# Patient Record
Sex: FEMALE | Race: WHITE | Employment: OTHER | URBAN - METROPOLITAN AREA
[De-identification: names, ages, dates, MRNs, and addresses within clinical notes are randomized per-mention and may not be internally consistent; named-entity substitution may affect disease eponyms.]

---

## 2017-01-02 ENCOUNTER — TELEPHONE (OUTPATIENT)
Dept: PEDIATRICS | Facility: CLINIC | Age: 57
End: 2017-01-02

## 2017-01-02 NOTE — Clinical Note
[facility]  [address]  [place] [address]  [phone]      January 13, 2017    Shae Chavarria                                                                                                                                                       [address] [address]              Dear Shae,    This letter is to remind you that you are due for your pap smear, mammogram, FIT test,  cholesterol check and annual exam.    Please call the [facility] for an appointment at [phone].    If you have already had these services with another provider please call our office or send us a [employer] message with this information so we can update our records.    If you have transferred your care elsewhere, please contact our office and we would be happy to forward your records. If you have any financial concerns regarding this appointment, please call so that we can discuss this further.      Sincerely,    Frieda Montanez CMA

## 2017-01-02 NOTE — TELEPHONE ENCOUNTER
Panel Management Review      Patient has the following on her problem list: None      Composite cancer screening  Chart review shows that this patient is due/due soon for the following Pap Smear, Mammogram and Fecal Colorectal (FIT)  Summary:    Patient is due/failing the following:   FIT, LDL, MAMMOGRAM, PAP and PHYSICAL    Action needed:   Patient needs office visit for pap, mammo, FIT, cholesterol check, physical.    Type of outreach:    Phone, left message for patient to call back.     Questions for provider review:    None                                                                                                                                    Frieda Montanez CMA    Chart routed to Care Team .

## 2017-01-09 NOTE — TELEPHONE ENCOUNTER
Called and left VM for patient to contact clinic.  Patient needs mammo, FIT, cholesterol check, pap & physical.  Frieda Montanez, CMA

## 2017-03-20 ENCOUNTER — HOSPITAL ENCOUNTER (EMERGENCY)
Facility: CLINIC | Age: 57
Discharge: HOME | End: 2017-03-20
Attending: PSYCHIATRY & NEUROLOGY | Admitting: PSYCHIATRY & NEUROLOGY
Payer: COMMERCIAL

## 2017-03-20 VITALS
WEIGHT: 152.3 LBS | DIASTOLIC BLOOD PRESSURE: 76 MMHG | BODY MASS INDEX: 26.56 KG/M2 | RESPIRATION RATE: 16 BRPM | SYSTOLIC BLOOD PRESSURE: 146 MMHG | OXYGEN SATURATION: 94 % | TEMPERATURE: 95.6 F | HEART RATE: 63 BPM

## 2017-03-20 DIAGNOSIS — F43.22 ADJUSTMENT DISORDER WITH ANXIOUS MOOD: ICD-10-CM

## 2017-03-20 LAB
AMPHETAMINES UR QL SCN: ABNORMAL
BARBITURATES UR QL: ABNORMAL
BENZODIAZ UR QL: ABNORMAL
CANNABINOIDS UR QL SCN: ABNORMAL
COCAINE UR QL: ABNORMAL
ETHANOL UR QL SCN: ABNORMAL
OPIATES UR QL SCN: ABNORMAL

## 2017-03-20 PROCEDURE — 90791 PSYCH DIAGNOSTIC EVALUATION: CPT

## 2017-03-20 PROCEDURE — 80320 DRUG SCREEN QUANTALCOHOLS: CPT | Performed by: EMERGENCY MEDICINE

## 2017-03-20 PROCEDURE — 80307 DRUG TEST PRSMV CHEM ANLYZR: CPT | Performed by: EMERGENCY MEDICINE

## 2017-03-20 PROCEDURE — 99284 EMERGENCY DEPT VISIT MOD MDM: CPT | Mod: Z6 | Performed by: PSYCHIATRY & NEUROLOGY

## 2017-03-20 PROCEDURE — 99285 EMERGENCY DEPT VISIT HI MDM: CPT | Mod: 25

## 2017-03-20 RX ORDER — ESCITALOPRAM OXALATE 10 MG/1
10 TABLET ORAL DAILY
Qty: 30 TABLET | Refills: 0 | Status: SHIPPED | OUTPATIENT
Start: 2017-03-20

## 2017-03-20 ASSESSMENT — ENCOUNTER SYMPTOMS
RESPIRATORY NEGATIVE: 1
DYSPHORIC MOOD: 1
CARDIOVASCULAR NEGATIVE: 1
NEUROLOGICAL NEGATIVE: 1
ENDOCRINE NEGATIVE: 1
EYES NEGATIVE: 1
SLEEP DISTURBANCE: 1
HYPERACTIVE: 0
NERVOUS/ANXIOUS: 1
GASTROINTESTINAL NEGATIVE: 1
CONSTITUTIONAL NEGATIVE: 1
MUSCULOSKELETAL NEGATIVE: 1
DECREASED CONCENTRATION: 1
HALLUCINATIONS: 0
HEMATOLOGIC/LYMPHATIC NEGATIVE: 1

## 2017-03-20 NOTE — ED PROVIDER NOTES
History     Chief Complaint   Patient presents with     Mental Health Problem     Suicidal     The history is provided by the patient and medical records.     Shae Chavarria is a 56 year old female who is here brought by daughter as patient has been feeling highly anxious and tearful. She had no prior history of mental illness nor chemical dependency. She reports her work got stressful a few months ago when she was diagnosed with cataracts and glaucoma. She works as a para for 16 years and she had been making some mistakes that was concerning to other staff. The principal has been reviewing her progress weekly and it has been upsetting. She was disclosing passive SI, prompting her daughter to bring her in. Patient does not feel she needs admission. She is open to seeing a therapist and trying a psychotropic. Patient denies drug use. Her drug screen is positive for opiates and she admitted to taking (only one time last night) tylenol with codeine to get some sleep. She saved an old prescription for when she had a D&C. There is no thought disorder.    PERSONAL MEDICAL HISTORY  History reviewed. No pertinent past medical history.  PAST SURGICAL HISTORY  Past Surgical History   Procedure Laterality Date     Gyn surgery            Eye surgery       bilateral cataract surgery     FAMILY HISTORY  Family History   Problem Relation Age of Onset     Hypertension Mother      Hyperlipidemia Father      SOCIAL HISTORY  Social History   Substance Use Topics     Smoking status: Former Smoker     Smokeless tobacco: Never Used     Alcohol use Yes      Comment: rarely     MEDICATIONS  No current facility-administered medications for this encounter.      Current Outpatient Prescriptions   Medication     escitalopram (LEXAPRO) 10 MG tablet     Multiple Vitamins-Minerals (MULTIVITAMIN PO)     ciclopirox 8 % SOLN     loratadine (CLARITIN) 10 MG tablet     hydrocortisone (ANUSOL-HC) 2.5 % rectal cream     IBUPROFEN PO      cetirizine (ZYRTEC) 10 MG tablet     mometasone (NASONEX) 50 MCG/ACT nasal spray     ALLERGIES  Allergies   Allergen Reactions     Amoxicillin      Keflex [Cephalexin Monohydrate]      I have reviewed the Medications, Allergies, Past Medical and Surgical History, and Social History in the Epic system.    Review of Systems   Constitutional: Negative.    HENT: Negative.    Eyes: Negative.    Respiratory: Negative.    Cardiovascular: Negative.    Gastrointestinal: Negative.    Endocrine: Negative.    Genitourinary: Negative.    Musculoskeletal: Negative.    Skin: Negative.    Neurological: Negative.    Hematological: Negative.    Psychiatric/Behavioral: Positive for decreased concentration, dysphoric mood and sleep disturbance. Negative for hallucinations and suicidal ideas. The patient is nervous/anxious. The patient is not hyperactive.    All other systems reviewed and are negative.      Physical Exam   BP: (!) 169/96  Heart Rate: 80  Temp: 96.3  F (35.7  C)  Resp: 16  Weight: 69.1 kg (152 lb 4.8 oz)  SpO2: 99 %  Physical Exam   Constitutional: She appears well-developed.   HENT:   Head: Normocephalic.   Eyes: Pupils are equal, round, and reactive to light.   Neck: Normal range of motion.   Cardiovascular: Normal rate.    Pulmonary/Chest: Effort normal.   Abdominal: Soft.   Musculoskeletal: Normal range of motion.   Neurological: She is alert.   Skin: Skin is warm.   Psychiatric: Her speech is normal and behavior is normal. Judgment and thought content normal. Her mood appears anxious. She is not agitated, not aggressive, not hyperactive, not actively hallucinating and not combative. Thought content is not paranoid and not delusional. Cognition and memory are normal. She expresses no homicidal and no suicidal ideation.   Nursing note and vitals reviewed.      ED Course     ED Course     Procedures      Labs Ordered and Resulted from Time of ED Arrival Up to the Time of Departure from the ED   DRUG ABUSE SCREEN 6 CHEM  DEP URINE ([facility]) - Abnormal; Notable for the following:        Result Value    Opiates Qualitative Urine   (*)     Value: Positive   Cutoff for a positive opiate is greater than 300 ng/mL. This is an unconfirmed   screening result to be used for medical purposes only.      All other components within normal limits       Assessments & Plan (with Medical Decision Making)   Patient with an adjustment disorder with anxious mood. She can be discharged. She was prescribed Lexapro. This should not interfere with her open angle glaucoma. She was also referred for therapy, set up by [facility]. Patient is to follow-up established care and services.    I have reviewed the nursing notes.    I have reviewed the findings, diagnosis, plan and need for follow up with the patient.    Discharge Medication List as of 3/20/2017  7:03 PM      START taking these medications    Details   escitalopram (LEXAPRO) 10 MG tablet Take 1 tablet (10 mg) by mouth daily, Disp-30 tablet, R-0, Local Print             Final diagnoses:   Adjustment disorder with anxious mood       3/20/2017   [facility], [place], EMERGENCY DEPARTMENT     Ramon Bermudez MD  03/20/17 2017

## 2017-03-20 NOTE — ED AVS SNAPSHOT
[facility], [place], Emergency Department    [address]    [facility] [place] [place] [address]    Phone:  [phone]    Fax:  [phone]                                       Shae Chavarria   MRN: [id number]    Department:  [facility], Emergency Department   Date of Visit:  3/20/2017           After Visit Summary Signature Page     I have received my discharge instructions, and my questions have been answered. I have discussed any challenges I see with this plan with the nurse or doctor.    ..........................................................................................................................................  Patient/Patient Representative Signature      ..........................................................................................................................................  Patient Representative Print Name and Relationship to Patient    ..................................................               ................................................  Date                                            Time    ..........................................................................................................................................  Reviewed by Signature/Title    ...................................................              ..............................................  Date                                                            Time

## 2017-03-20 NOTE — DISCHARGE INSTRUCTIONS
Take Lexapro to manage mood (anxiety). Follow-up with your established care provider for further management and refills until you can see the [facility]-referred provider  Follow-up [facility]-referred therapy for support and coping skills

## 2017-03-20 NOTE — ED AVS SNAPSHOT
[facility], Emergency Department    [address]    [place] [address]    Phone:  [phone]    Fax:  [phone]                                       Shae Chavarria   MRN: [id number]    Department:  [facility], Emergency Department   Date of Visit:  3/20/2017           Patient Information     Date Of Birth          1960        Your diagnoses for this visit were:     Adjustment disorder with anxious mood        You were seen by Ramon Bermudez MD.      Follow-up Information     Follow up with No Ref-Primary, Physician.        Discharge Instructions       Take Lexapro to manage mood (anxiety). Follow-up with your established care provider for further management and refills until you can see the [facility]-referred provider  Follow-up [facility]-referred therapy for support and coping skills    24 Hour Appointment Hotline       To make an appointment at any [facility] clinic, call [phone] ([phone]). If you don't have a family doctor or clinic, we will help you find one. [facility] clinics are conveniently located to serve the needs of you and your family.             Review of your medicines      START taking        Dose / Directions Last dose taken    escitalopram 10 MG tablet   Commonly known as:  LEXAPRO   Dose:  10 mg   Quantity:  30 tablet        Take 1 tablet (10 mg) by mouth daily   Refills:  0          Our records show that you are taking the medicines listed below. If these are incorrect, please call your family doctor or clinic.        Dose / Directions Last dose taken    cetirizine 10 MG tablet   Commonly known as:  zyrTEC   Dose:  10 mg   Quantity:  30 tablet        Take 1 tablet (10 mg) by mouth every evening   Refills:  1        ciclopirox 8 % Soln   Quantity:  3 Bottle        Apply every day for up to 12 months.  First day of each week, remove old medication and roughen surface of nail.   Refills:  2        hydrocortisone 2.5 % cream   Commonly known as:  ANUSOL-HC   Quantity:   "28.35 g        Place rectally 2 times daily   Refills:  3        IBUPROFEN PO        Refills:  0        loratadine 10 MG tablet   Commonly known as:  CLARITIN   Dose:  10 mg        Take 10 mg by mouth daily   Refills:  0        MULTIVITAMIN PO        Take by mouth daily   Refills:  0        NASONEX 50 MCG/ACT spray   Dose:  2 spray   Generic drug:  mometasone        2 sprays by Both Nostrils route daily.   Refills:  0                Prescriptions were sent or printed at these locations (1 Prescription)                   Other Prescriptions                Printed at Department/Unit printer (1 of 1)         escitalopram (LEXAPRO) 10 MG tablet                Procedures and tests performed during your visit     Drug abuse screen 6 urine (tox)      Orders Needing Specimen Collection     None      Pending Results     No orders found from 3/18/2017 to 3/21/2017.            Pending Culture Results     No orders found from 3/18/2017 to 3/21/2017.            Thank you for choosing [facility]       Thank you for choosing [facility] for your care. Our goal is always to provide you with excellent care. Hearing back from our patients is one way we can continue to improve our services. Please take a few minutes to complete the written survey that you may receive in the mail after you visit with us. Thank you!        [employer] Information     [employer] lets you send messages to your doctor, view your test results, renew your prescriptions, schedule appointments and more. To sign up, go to [email or web address] . Click on \"Log in\" on the left side of the screen, which will take you to the Welcome page. Then click on \"Sign up Now\" on the right side of the page.     You will be asked to enter the access code listed below, as well as some personal information. Please follow the directions to create your username and password.     Your access code is: [email or web address]  Expires: 2017  5:21 PM     Your access code will  in 90 days. If " you need help or a new code, please call your [facility] clinic or [phone].        Care EveryWhere ID     This is your Care EveryWhere ID. This could be used by other organizations to access your [facility] medical records  [id number]        After Visit Summary       This is your record. Keep this with you and show to your community pharmacist(s) and doctor(s) at your next visit.

## 2018-07-07 ENCOUNTER — TRANSFERRED RECORDS (OUTPATIENT)
Dept: HEALTH INFORMATION MANAGEMENT | Facility: CLINIC | Age: 58
End: 2018-07-07

## 2019-11-08 ENCOUNTER — VIRTUAL VISIT (OUTPATIENT)
Dept: FAMILY MEDICINE | Facility: OTHER | Age: 59
End: 2019-11-08

## 2019-11-09 NOTE — PROGRESS NOTES
"Date: 2019 21:55:34  Clinician: Yessica Harden  Clinician NPI: [id number]  Patient: Shae Chavarria  Patient : 1960  Patient Address: [address] [place] [address]  Patient Phone: [phone]  Visit Protocol: Tinea  Patient Summary:  Shae is a 59 year old ( : 1960 ) female who initiated a Visit for evaluation of Athlete's foot. When asked the question \"Please sign me up to receive news, health information and promotions. \", Shae responded \"Yes\".     Her symptoms started more than a month ago. The rash is red in color.   The affected area has dry, flaky skin. It feels burning, painful, itchy, and tender to touch.   Symptom details     Itching: Shae has moderate itching.     Pain: The pain is moderate (4-6 on a 10 point pain scale).      Denied symptoms include crusts, pimples, and blisters. Shae also denied raised, scaly patches on elbows and the front or back of the knees. Shae does not feel feverish. She does not have a rash in the shape of a bull's-eye. There are no red streaks extending from the rash.    Precipitating events  Just before the symptoms started, Shae did not come in contact with plants such as poison ivy or poison oak. She also did not come in contact with new soaps, lotions, or detergents.   Pertinent medical history  Treatments or home remedies used to relieve the symptoms as reported by the patient (free text): Urea 40 gel. I know what i have. It is moccasin tinea pedis. I stepped on the outside of a sunflower seed a couple years ago and i keep getting this every year but I cqn't just order the Ketoconazole.   Weight: 151 lbs    Shae does not smoke or use smokeless tobacco.          mometasone nasal    Claritin-D 24 Hour oral    montelukast oral    clonidine HCl oral       MEDICATIONS: mometasone nasal, Claritin-D 24 Hour oral, montelukast oral, clonidine HCl oral, ALLERGIES: amoxicillin, Keflex  Clinician Response:  Dear Shae,   Based on the information provided, you " have a fungal infection of your feet called tinea pedis (athlete's foot). Athlete's foot causes a red, itchy rash with cracked, peeling, or blistered skin on the feet, usually located between the toes.   This type of infection is caused by an overgrowth of the fungal germs normally on the body. Fungus grows best in warm moist areas.   Medication information  I am prescribing:     Terbinafine HCl 250 mg oral tablet. Take 1 tablet by mouth 1 time per day for 14 days. There are no refills with this prescription.   Self care  Touching the rash or an object that was in contact with the rash can spread the infection to other parts of your body and to other people. Wash your hands after touching the rash, and wash clothing, towels, and bedsheets often.  Do not go barefoot and consider wearing sandals in warm wet areas like a shower or tub, pool areas, or locker rooms to prevent spreading the infection to others. This will also lessen your chance of having athlete's foot again.  Steps you can take to be as comfortable as possible:     Avoid scratching the rash    Wash as you normally would, but be sure the affected area is dried very well when finished    Use mild soap and laundry detergent    Choose clothing and bedding made of a breathable material like cotton    Do not use antibiotic creams or ointments unless recommended by a  provider     When to seek care  Please make an appointment to be seen in a clinic or urgent care if any of the following occur:     Symptoms do not improve after 7 days of treatment    New symptoms develop, or symptoms become worse    Symptoms are so severe that you are unable to sleep or do regular activities    You have areas of broken areas from scratching    You notice symptoms of a skin infection (spreading redness, pain that is not improving, fever, warmth)      Diagnosis: Tinea pedis  Diagnosis ICD: B35.3  Prescription: terbinafine HCl 250 mg oral tablet 14 tablet, 14 days supply. Take 1  tablet by mouth 1 time per day for 14 days. Refills: 0, Refill as needed: no, Allow substitutions: yes  Pharmacy: [facility]/pharmacy #[id number] - [phone] - [address], [place] [id number]

## 2020-06-09 ENCOUNTER — OFFICE VISIT (OUTPATIENT)
Dept: URGENT CARE | Facility: URGENT CARE | Age: 60
End: 2020-06-09
Payer: MEDICARE

## 2020-06-09 VITALS
OXYGEN SATURATION: 99 % | HEART RATE: 80 BPM | TEMPERATURE: 97.4 F | RESPIRATION RATE: 20 BRPM | SYSTOLIC BLOOD PRESSURE: 128 MMHG | DIASTOLIC BLOOD PRESSURE: 68 MMHG

## 2020-06-09 DIAGNOSIS — N39.0 URINARY TRACT INFECTION WITHOUT HEMATURIA, SITE UNSPECIFIED: Primary | ICD-10-CM

## 2020-06-09 DIAGNOSIS — R82.90 NONSPECIFIC FINDING ON EXAMINATION OF URINE: ICD-10-CM

## 2020-06-09 DIAGNOSIS — R35.0 URINARY FREQUENCY: ICD-10-CM

## 2020-06-09 LAB
ALBUMIN UR-MCNC: NEGATIVE MG/DL
APPEARANCE UR: ABNORMAL
BACTERIA #/AREA URNS HPF: ABNORMAL /HPF
BILIRUB UR QL STRIP: NEGATIVE
COLOR UR AUTO: YELLOW
GLUCOSE UR STRIP-MCNC: NEGATIVE MG/DL
HGB UR QL STRIP: ABNORMAL
KETONES UR STRIP-MCNC: ABNORMAL MG/DL
LEUKOCYTE ESTERASE UR QL STRIP: ABNORMAL
MUCOUS THREADS #/AREA URNS LPF: PRESENT /LPF
NITRATE UR QL: POSITIVE
NON-SQ EPI CELLS #/AREA URNS LPF: ABNORMAL /LPF
PH UR STRIP: 6 PH (ref 5–7)
RBC #/AREA URNS AUTO: ABNORMAL /HPF
SOURCE: ABNORMAL
SP GR UR STRIP: 1.02 (ref 1–1.03)
UROBILINOGEN UR STRIP-ACNC: 0.2 EU/DL (ref 0.2–1)
WBC #/AREA URNS AUTO: ABNORMAL /HPF

## 2020-06-09 PROCEDURE — 99203 OFFICE O/P NEW LOW 30 MIN: CPT | Performed by: PHYSICIAN ASSISTANT

## 2020-06-09 PROCEDURE — 87088 URINE BACTERIA CULTURE: CPT | Performed by: PHYSICIAN ASSISTANT

## 2020-06-09 PROCEDURE — 87086 URINE CULTURE/COLONY COUNT: CPT | Performed by: PHYSICIAN ASSISTANT

## 2020-06-09 PROCEDURE — 87186 SC STD MICRODIL/AGAR DIL: CPT | Performed by: PHYSICIAN ASSISTANT

## 2020-06-09 PROCEDURE — 81001 URINALYSIS AUTO W/SCOPE: CPT | Performed by: PHYSICIAN ASSISTANT

## 2020-06-09 RX ORDER — SULFAMETHOXAZOLE/TRIMETHOPRIM 800-160 MG
1 TABLET ORAL 2 TIMES DAILY
Qty: 14 TABLET | Refills: 0 | Status: SHIPPED | OUTPATIENT
Start: 2020-06-09 | End: 2020-06-16

## 2020-06-09 NOTE — PATIENT INSTRUCTIONS
"  Patient Education     Bladder Infection, Female (Adult)    Urine is normally doesn't have any bacteria in it. But bacteria can get into the urinary tract from the skin around the rectum. Or they can travel in the blood from elsewhere in the body. Once they are in your urinary tract, they can cause infection in the urethra (urethritis), the bladder (cystitis), or the kidneys (pyelonephritis).  The most common place for an infection is in the bladder. This is called a bladder infection. This is one of the most common infections in women. Most bladder infections are easily treated. They are not serious unless the infection spreads to the kidney.  The phrases \"bladder infection,\" \"UTI,\" and \"cystitis\" are often used to describe the same thing. But they are not always the same. Cystitis is an inflammation of the bladder. The most common cause of cystitis is an infection.  Symptoms  The infection causes inflammation in the urethra and bladder. This causes many of the symptoms. The most common symptoms of a bladder infection are:    Pain or burning when urinating    Having to urinate more often than usual    Urgent need to urinate    Only a small amount of urine comes out    Blood in urine    Abdominal discomfort. This is usually in the lower abdomen above the pubic bone.    Cloudy urine    Strong- or bad-smelling urine    Unable to urinate (urinary retention)    Unable to hold urine in (urinary incontinence)    Fever    Loss of appetite    Confusion (in older adults)  Causes  Bladder infections are not contagious. You can't get one from someone else, from a toilet seat, or from sharing a bath.  The most common cause of bladder infections is bacteria from the bowels. The bacteria get onto the skin around the opening of the urethra. From there, they can get into the urine and travel up to the bladder, causing inflammation and infection. This usually happens because of:    Wiping improperly after urinating. Always wipe " from front to back.    Bowel incontinence    Pregnancy    Procedures such as having a catheter inserted    Older age    Not emptying your bladder. This can allow bacteria a chance to grow in your urine.    Dehydration    Constipation    Sex    Use of a diaphragm for birth control   Treatment  Bladder infections are diagnosed by a urine test. They are treated with antibiotics and usually clear up quickly without complications. Treatment helps prevent a more serious kidney infection.  Medicines  Medicines can help in the treatment of a bladder infection:    Take antibiotics until they are used up, even if you feel better. It is important to finish them to make sure the infection has cleared.    You can use acetaminophen or ibuprofen for pain, fever, or discomfort, unless another medicine was prescribed. If you have chronic liver or kidney disease, talk with your healthcare provider before using these medicines. Also talk with your provider if you've ever had a stomach ulcer or gastrointestinal bleeding, or are taking blood-thinner medicines.    If you are given phenazopydridine to reduce burning with urination, it will cause your urine to become a bright orange color. This can stain clothing.  Care and prevention  These self-care steps can help prevent future infections:    Drink plenty of fluids to prevent dehydration and flush out your bladder. Do this unless you must restrict fluids for other health reasons, or your doctor told you not to.    Proper cleaning after going to the bathroom is important. Wipe from front to back after using the toilet to prevent the spread of bacteria.    Urinate more often. Don't try to hold urine in for a long time.    Wear loose-fitting clothes and cotton underwear. Avoid tight-fitting pants.    Improve your diet and prevent constipation. Eat more fresh fruit and vegetables, and fiber, and less junk and fatty foods.    Avoid sex until your symptoms are gone.    Avoid caffeine,  alcohol, and spicy foods. These can irritate your bladder.    Urinate right after intercourse to flush out your bladder.    If you use birth control pills and have frequent bladder infections, discuss it with your doctor.  Follow-up care  Call your healthcare provider if all symptoms are not gone after 3 days of treatment. This is especially important if you have repeat infections.  If a culture was done, you will be told if your treatment needs to be changed. If directed, you can call to find out the results.  If X-rays were done, you will be told if the results will affect your treatment.  Call 911  Call 911 if any of the following occur:    Trouble breathing    Hard to wake up or confusion    Fainting or loss of consciousness    Rapid heart rate  When to seek medical advice  Call your healthcare provider right away if any of these occur:    Fever of 100.4 F (38.0 C) or higher, or as directed by your healthcare provider    Symptoms are not better by the third day of treatment    Back or belly (abdominal) pain that gets worse    Repeated vomiting, or unable to keep medicine down    Weakness or dizziness    Vaginal discharge    Pain, redness, or swelling in the outer vaginal area (labia)  Date Last Reviewed: 10/1/2016    [id number] [employer]. [address] [place] [address], [place] [address]. All rights reserved. This information is not intended as a substitute for professional medical care. Always follow your healthcare professional's instructions.

## 2020-06-09 NOTE — PROGRESS NOTES
SUBJECTIVE:   Shae Chavarria is a 60 year old female who  presents today for a possible UTI. Symptoms of dysuria and frequency have been going on for 1day(s).  Hematuria no.  sudden onsetand mild.  There is no history of fever, chills, nausea or vomiting.  No history of vaginal or penile discharge. This patient does  have a history of urinary tract infections. Patient denies long duration, rigors, flank pain, temperature > 101 degrees F., Vomiting, significant nausea or diarrhea, taking Coumadin and GFR less than 30 within the last year or vaginal discharge, vaginal odor, vaginal itching and dyspareunia (pain in labia/pelvis)     History reviewed. No pertinent past medical history.  Current Outpatient Medications   Medication Sig Dispense Refill     cetirizine (ZYRTEC) 10 MG tablet Take 1 tablet (10 mg) by mouth every evening 30 tablet 1     ciclopirox 8 % SOLN Apply every day for up to 12 months.  First day of each week, remove old medication and roughen surface of nail. 3 Bottle 2     escitalopram (LEXAPRO) 10 MG tablet Take 1 tablet (10 mg) by mouth daily 30 tablet 0     hydrocortisone (ANUSOL-HC) 2.5 % rectal cream Place rectally 2 times daily 28.35 g 3     IBUPROFEN PO        loratadine (CLARITIN) 10 MG tablet Take 10 mg by mouth daily       mometasone (NASONEX) 50 MCG/ACT nasal spray 2 sprays by Both Nostrils route daily.       Multiple Vitamins-Minerals (MULTIVITAMIN PO) Take by mouth daily       Social History     Tobacco Use     Smoking status: Former Smoker     Smokeless tobacco: Never Used   Substance Use Topics     Alcohol use: Yes     Comment: rarely       ROS:   10 point ROS negative except as listed above      OBJECTIVE:  /68   Pulse 80   Temp 97.4  F (36.3  C) (Tympanic)   Resp 20   SpO2 99%   GENERAL APPEARANCE: healthy, alert and no distress  RESP: lungs clear to auscultation - no rales, rhonchi or wheezes  CV: regular rates and rhythm, normal S1 S2, no murmur noted  BACK: No CVA  tenderness  SKIN: no suspicious lesions or rashes      Results for orders placed or performed in visit on 06/09/20   UA reflex to Microscopic and Culture     Status: Abnormal    Specimen: Midstream Urine   Result Value Ref Range    Color Urine Yellow     Appearance Urine Slightly Cloudy     Glucose Urine Negative NEG^Negative mg/dL    Bilirubin Urine Negative NEG^Negative    Ketones Urine Trace (A) NEG^Negative mg/dL    Specific Gravity Urine 1.020 1.003 - 1.035    Blood Urine Trace (A) NEG^Negative    pH Urine 6.0 5.0 - 7.0 pH    Protein Albumin Urine Negative NEG^Negative mg/dL    Urobilinogen Urine 0.2 0.2 - 1.0 EU/dL    Nitrite Urine Positive (A) NEG^Negative    Leukocyte Esterase Urine Moderate (A) NEG^Negative    Source Midstream Urine    Urine Microscopic     Status: Abnormal   Result Value Ref Range    WBC Urine 10-25 (A) OTO5^0 - 5 /HPF    RBC Urine O - 2 OTO2^O - 2 /HPF    Squamous Epithelial /LPF Urine Few FEW^Few /LPF    Bacteria Urine Many (A) NEG^Negative /HPF    Mucous Urine Present (A) NEG^Negative /LPF       ASSESSMENT:   (N39.0) Urinary tract infection without hematuria, site unspecified  (primary encounter diagnosis)  Plan: sulfamethoxazole-trimethoprim (BACTRIM DS)         800-160 MG tablet      (R35.0) Urinary frequency  Plan: UA reflex to Microscopic and Culture, Urine         Microscopic      (R82.90) Nonspecific finding on examination of urine  Plan: Urine Culture Aerobic Bacterial      Red flags and emergent follow up discussed, and understood by patient  Follow up with PCP if symptoms worsen or fail to improve      Patient Instructions     Patient Education     Bladder Infection, Female (Adult)    Urine is normally doesn't have any bacteria in it. But bacteria can get into the urinary tract from the skin around the rectum. Or they can travel in the blood from elsewhere in the body. Once they are in your urinary tract, they can cause infection in the urethra (urethritis), the bladder  "(cystitis), or the kidneys (pyelonephritis).  The most common place for an infection is in the bladder. This is called a bladder infection. This is one of the most common infections in women. Most bladder infections are easily treated. They are not serious unless the infection spreads to the kidney.  The phrases \"bladder infection,\" \"UTI,\" and \"cystitis\" are often used to describe the same thing. But they are not always the same. Cystitis is an inflammation of the bladder. The most common cause of cystitis is an infection.  Symptoms  The infection causes inflammation in the urethra and bladder. This causes many of the symptoms. The most common symptoms of a bladder infection are:    Pain or burning when urinating    Having to urinate more often than usual    Urgent need to urinate    Only a small amount of urine comes out    Blood in urine    Abdominal discomfort. This is usually in the lower abdomen above the pubic bone.    Cloudy urine    Strong- or bad-smelling urine    Unable to urinate (urinary retention)    Unable to hold urine in (urinary incontinence)    Fever    Loss of appetite    Confusion (in older adults)  Causes  Bladder infections are not contagious. You can't get one from someone else, from a toilet seat, or from sharing a bath.  The most common cause of bladder infections is bacteria from the bowels. The bacteria get onto the skin around the opening of the urethra. From there, they can get into the urine and travel up to the bladder, causing inflammation and infection. This usually happens because of:    Wiping improperly after urinating. Always wipe from front to back.    Bowel incontinence    Pregnancy    Procedures such as having a catheter inserted    Older age    Not emptying your bladder. This can allow bacteria a chance to grow in your urine.    Dehydration    Constipation    Sex    Use of a diaphragm for birth control   Treatment  Bladder infections are diagnosed by a urine test. They are " treated with antibiotics and usually clear up quickly without complications. Treatment helps prevent a more serious kidney infection.  Medicines  Medicines can help in the treatment of a bladder infection:    Take antibiotics until they are used up, even if you feel better. It is important to finish them to make sure the infection has cleared.    You can use acetaminophen or ibuprofen for pain, fever, or discomfort, unless another medicine was prescribed. If you have chronic liver or kidney disease, talk with your healthcare provider before using these medicines. Also talk with your provider if you've ever had a stomach ulcer or gastrointestinal bleeding, or are taking blood-thinner medicines.    If you are given phenazopydridine to reduce burning with urination, it will cause your urine to become a bright orange color. This can stain clothing.  Care and prevention  These self-care steps can help prevent future infections:    Drink plenty of fluids to prevent dehydration and flush out your bladder. Do this unless you must restrict fluids for other health reasons, or your doctor told you not to.    Proper cleaning after going to the bathroom is important. Wipe from front to back after using the toilet to prevent the spread of bacteria.    Urinate more often. Don't try to hold urine in for a long time.    Wear loose-fitting clothes and cotton underwear. Avoid tight-fitting pants.    Improve your diet and prevent constipation. Eat more fresh fruit and vegetables, and fiber, and less junk and fatty foods.    Avoid sex until your symptoms are gone.    Avoid caffeine, alcohol, and spicy foods. These can irritate your bladder.    Urinate right after intercourse to flush out your bladder.    If you use birth control pills and have frequent bladder infections, discuss it with your doctor.  Follow-up care  Call your healthcare provider if all symptoms are not gone after 3 days of treatment. This is especially important if you  have repeat infections.  If a culture was done, you will be told if your treatment needs to be changed. If directed, you can call to find out the results.  If X-rays were done, you will be told if the results will affect your treatment.  Call 911  Call 911 if any of the following occur:    Trouble breathing    Hard to wake up or confusion    Fainting or loss of consciousness    Rapid heart rate  When to seek medical advice  Call your healthcare provider right away if any of these occur:    Fever of 100.4 F (38.0 C) or higher, or as directed by your healthcare provider    Symptoms are not better by the third day of treatment    Back or belly (abdominal) pain that gets worse    Repeated vomiting, or unable to keep medicine down    Weakness or dizziness    Vaginal discharge    Pain, redness, or swelling in the outer vaginal area (labia)  Date Last Reviewed: 10/1/2016    3660-8149 [employer]. [address] [place] [address], [place] [address]. All rights reserved. This information is not intended as a substitute for professional medical care. Always follow your healthcare professional's instructions.

## 2020-06-10 LAB
BACTERIA SPEC CULT: ABNORMAL
SPECIMEN SOURCE: ABNORMAL

## 2020-07-06 ENCOUNTER — TRANSFERRED RECORDS (OUTPATIENT)
Dept: HEALTH INFORMATION MANAGEMENT | Facility: CLINIC | Age: 60
End: 2020-07-06

## 2020-07-07 ENCOUNTER — TRANSFERRED RECORDS (OUTPATIENT)
Dept: HEALTH INFORMATION MANAGEMENT | Facility: CLINIC | Age: 60
End: 2020-07-07

## 2021-05-15 ENCOUNTER — HEALTH MAINTENANCE LETTER (OUTPATIENT)
Age: 61
End: 2021-05-15

## 2021-09-04 ENCOUNTER — HEALTH MAINTENANCE LETTER (OUTPATIENT)
Age: 61
End: 2021-09-04

## 2022-06-05 ENCOUNTER — HEALTH MAINTENANCE LETTER (OUTPATIENT)
Age: 62
End: 2022-06-05

## 2022-10-16 ENCOUNTER — HEALTH MAINTENANCE LETTER (OUTPATIENT)
Age: 62
End: 2022-10-16

## 2022-10-22 ENCOUNTER — LAB REQUISITION (OUTPATIENT)
Dept: LAB | Facility: CLINIC | Age: 62
End: 2022-10-22
Payer: COMMERCIAL

## 2022-10-22 LAB
GRAM STAIN RESULT: ABNORMAL
GRAM STAIN RESULT: ABNORMAL

## 2022-10-22 PROCEDURE — 87075 CULTR BACTERIA EXCEPT BLOOD: CPT | Mod: ORL | Performed by: OPHTHALMOLOGY

## 2022-10-22 PROCEDURE — 87205 SMEAR GRAM STAIN: CPT | Mod: ORL | Performed by: OPHTHALMOLOGY

## 2022-10-22 PROCEDURE — 87070 CULTURE OTHR SPECIMN AEROBIC: CPT | Mod: ORL | Performed by: OPHTHALMOLOGY

## 2022-10-22 PROCEDURE — 87077 CULTURE AEROBIC IDENTIFY: CPT | Mod: ORL | Performed by: OPHTHALMOLOGY

## 2022-10-25 LAB — BACTERIA FLD CULT: ABNORMAL

## 2022-11-05 LAB — BACTERIA FLD CULT: NORMAL

## 2023-06-01 ENCOUNTER — HEALTH MAINTENANCE LETTER (OUTPATIENT)
Age: 63
End: 2023-06-01

## 2023-06-17 ENCOUNTER — HEALTH MAINTENANCE LETTER (OUTPATIENT)
Age: 63
End: 2023-06-17

## 2024-12-16 ENCOUNTER — HOSPITAL ENCOUNTER (OUTPATIENT)
Facility: CLINIC | Age: 64
End: 2024-12-16
Attending: INTERNAL MEDICINE | Admitting: INTERNAL MEDICINE
Payer: COMMERCIAL